# Patient Record
Sex: FEMALE | Race: BLACK OR AFRICAN AMERICAN | HISPANIC OR LATINO | Employment: FULL TIME | URBAN - METROPOLITAN AREA
[De-identification: names, ages, dates, MRNs, and addresses within clinical notes are randomized per-mention and may not be internally consistent; named-entity substitution may affect disease eponyms.]

---

## 2024-01-25 ENCOUNTER — APPOINTMENT (OUTPATIENT)
Dept: ULTRASOUND IMAGING | Facility: CLINIC | Age: 24
End: 2024-01-25
Attending: EMERGENCY MEDICINE
Payer: COMMERCIAL

## 2024-01-25 ENCOUNTER — HOSPITAL ENCOUNTER (EMERGENCY)
Facility: CLINIC | Age: 24
Discharge: HOME | End: 2024-01-25
Attending: EMERGENCY MEDICINE | Admitting: EMERGENCY MEDICINE
Payer: COMMERCIAL

## 2024-01-25 VITALS
RESPIRATION RATE: 16 BRPM | DIASTOLIC BLOOD PRESSURE: 84 MMHG | HEART RATE: 95 BPM | OXYGEN SATURATION: 99 % | SYSTOLIC BLOOD PRESSURE: 137 MMHG | TEMPERATURE: 97.3 F

## 2024-01-25 DIAGNOSIS — K80.20 GALLSTONES: ICD-10-CM

## 2024-01-25 DIAGNOSIS — N39.0 URINARY TRACT INFECTION WITHOUT HEMATURIA, SITE UNSPECIFIED: ICD-10-CM

## 2024-01-25 DIAGNOSIS — Z32.01 PREGNANCY TEST POSITIVE: ICD-10-CM

## 2024-01-25 LAB
ALBUMIN SERPL BCG-MCNC: 4.4 G/DL (ref 3.5–5.2)
ALBUMIN UR-MCNC: 30 MG/DL
ALP SERPL-CCNC: 66 U/L (ref 40–150)
ALT SERPL W P-5'-P-CCNC: 7 U/L (ref 0–50)
ANION GAP SERPL CALCULATED.3IONS-SCNC: 12 MMOL/L (ref 7–15)
APPEARANCE UR: ABNORMAL
AST SERPL W P-5'-P-CCNC: 15 U/L (ref 0–45)
BACTERIA #/AREA URNS HPF: ABNORMAL /HPF
BASOPHILS # BLD AUTO: 0 10E3/UL (ref 0–0.2)
BASOPHILS NFR BLD AUTO: 0 %
BILIRUB SERPL-MCNC: 0.2 MG/DL
BILIRUB UR QL STRIP: NEGATIVE
BUN SERPL-MCNC: 9.5 MG/DL (ref 6–20)
CALCIUM SERPL-MCNC: 8.9 MG/DL (ref 8.6–10)
CHLORIDE SERPL-SCNC: 97 MMOL/L (ref 98–107)
COLOR UR AUTO: YELLOW
CREAT SERPL-MCNC: 0.6 MG/DL (ref 0.51–0.95)
DEPRECATED HCO3 PLAS-SCNC: 23 MMOL/L (ref 22–29)
EGFRCR SERPLBLD CKD-EPI 2021: >90 ML/MIN/1.73M2
EOSINOPHIL # BLD AUTO: 0 10E3/UL (ref 0–0.7)
EOSINOPHIL NFR BLD AUTO: 0 %
ERYTHROCYTE [DISTWIDTH] IN BLOOD BY AUTOMATED COUNT: 13.5 % (ref 10–15)
GLUCOSE SERPL-MCNC: 84 MG/DL (ref 70–99)
GLUCOSE UR STRIP-MCNC: NEGATIVE MG/DL
HCG INTACT+B SERPL-ACNC: ABNORMAL MIU/ML
HCG UR QL: POSITIVE
HCT VFR BLD AUTO: 35.1 % (ref 35–47)
HGB BLD-MCNC: 11.6 G/DL (ref 11.7–15.7)
HGB UR QL STRIP: ABNORMAL
IMM GRANULOCYTES # BLD: 0.1 10E3/UL
IMM GRANULOCYTES NFR BLD: 0 %
KETONES UR STRIP-MCNC: 40 MG/DL
LEUKOCYTE ESTERASE UR QL STRIP: ABNORMAL
LIPASE SERPL-CCNC: 18 U/L (ref 13–60)
LYMPHOCYTES # BLD AUTO: 2.5 10E3/UL (ref 0.8–5.3)
LYMPHOCYTES NFR BLD AUTO: 19 %
MAGNESIUM SERPL-MCNC: 1.8 MG/DL (ref 1.7–2.3)
MCH RBC QN AUTO: 26.3 PG (ref 26.5–33)
MCHC RBC AUTO-ENTMCNC: 33 G/DL (ref 31.5–36.5)
MCV RBC AUTO: 80 FL (ref 78–100)
MONOCYTES # BLD AUTO: 0.8 10E3/UL (ref 0–1.3)
MONOCYTES NFR BLD AUTO: 6 %
MUCOUS THREADS #/AREA URNS LPF: PRESENT /LPF
NEUTROPHILS # BLD AUTO: 10.2 10E3/UL (ref 1.6–8.3)
NEUTROPHILS NFR BLD AUTO: 75 %
NITRATE UR QL: NEGATIVE
NRBC # BLD AUTO: 0 10E3/UL
NRBC BLD AUTO-RTO: 0 /100
PH UR STRIP: 6.5 [PH] (ref 5–7)
PLATELET # BLD AUTO: 251 10E3/UL (ref 150–450)
POTASSIUM SERPL-SCNC: 3.6 MMOL/L (ref 3.4–5.3)
PROT SERPL-MCNC: 7.4 G/DL (ref 6.4–8.3)
RBC # BLD AUTO: 4.41 10E6/UL (ref 3.8–5.2)
RBC URINE: 3 /HPF
SODIUM SERPL-SCNC: 132 MMOL/L (ref 135–145)
SP GR UR STRIP: 1.02 (ref 1–1.03)
SQUAMOUS EPITHELIAL: 19 /HPF
UROBILINOGEN UR STRIP-MCNC: NORMAL MG/DL
WBC # BLD AUTO: 13.6 10E3/UL (ref 4–11)
WBC CLUMPS #/AREA URNS HPF: PRESENT /HPF
WBC URINE: 38 /HPF

## 2024-01-25 PROCEDURE — 99285 EMERGENCY DEPT VISIT HI MDM: CPT | Mod: 25

## 2024-01-25 PROCEDURE — 81025 URINE PREGNANCY TEST: CPT | Performed by: EMERGENCY MEDICINE

## 2024-01-25 PROCEDURE — 76801 OB US < 14 WKS SINGLE FETUS: CPT

## 2024-01-25 PROCEDURE — 250N000011 HC RX IP 250 OP 636: Mod: JZ | Performed by: EMERGENCY MEDICINE

## 2024-01-25 PROCEDURE — 80053 COMPREHEN METABOLIC PANEL: CPT | Performed by: EMERGENCY MEDICINE

## 2024-01-25 PROCEDURE — 258N000003 HC RX IP 258 OP 636: Performed by: EMERGENCY MEDICINE

## 2024-01-25 PROCEDURE — 84702 CHORIONIC GONADOTROPIN TEST: CPT | Performed by: EMERGENCY MEDICINE

## 2024-01-25 PROCEDURE — 96361 HYDRATE IV INFUSION ADD-ON: CPT

## 2024-01-25 PROCEDURE — 85025 COMPLETE CBC W/AUTO DIFF WBC: CPT | Performed by: EMERGENCY MEDICINE

## 2024-01-25 PROCEDURE — 96374 THER/PROPH/DIAG INJ IV PUSH: CPT

## 2024-01-25 PROCEDURE — 76705 ECHO EXAM OF ABDOMEN: CPT

## 2024-01-25 PROCEDURE — 81001 URINALYSIS AUTO W/SCOPE: CPT | Performed by: EMERGENCY MEDICINE

## 2024-01-25 PROCEDURE — 36415 COLL VENOUS BLD VENIPUNCTURE: CPT | Performed by: EMERGENCY MEDICINE

## 2024-01-25 PROCEDURE — 87086 URINE CULTURE/COLONY COUNT: CPT | Performed by: EMERGENCY MEDICINE

## 2024-01-25 PROCEDURE — 83690 ASSAY OF LIPASE: CPT | Performed by: EMERGENCY MEDICINE

## 2024-01-25 PROCEDURE — 83735 ASSAY OF MAGNESIUM: CPT | Performed by: EMERGENCY MEDICINE

## 2024-01-25 PROCEDURE — 250N000013 HC RX MED GY IP 250 OP 250 PS 637: Performed by: EMERGENCY MEDICINE

## 2024-01-25 RX ORDER — METOCLOPRAMIDE HYDROCHLORIDE 5 MG/ML
10 INJECTION INTRAMUSCULAR; INTRAVENOUS ONCE
Status: COMPLETED | OUTPATIENT
Start: 2024-01-25 | End: 2024-01-25

## 2024-01-25 RX ORDER — CEPHALEXIN 500 MG/1
500 CAPSULE ORAL 2 TIMES DAILY
Qty: 14 CAPSULE | Refills: 0 | Status: SHIPPED | OUTPATIENT
Start: 2024-01-25 | End: 2024-02-01

## 2024-01-25 RX ORDER — MAGNESIUM HYDROXIDE/ALUMINUM HYDROXICE/SIMETHICONE 120; 1200; 1200 MG/30ML; MG/30ML; MG/30ML
15 SUSPENSION ORAL ONCE
Status: COMPLETED | OUTPATIENT
Start: 2024-01-25 | End: 2024-01-25

## 2024-01-25 RX ADMIN — SODIUM CHLORIDE 1000 ML: 9 INJECTION, SOLUTION INTRAVENOUS at 19:27

## 2024-01-25 RX ADMIN — ALUMINUM HYDROXIDE, MAGNESIUM HYDROXIDE, AND SIMETHICONE 15 ML: 200; 200; 20 SUSPENSION ORAL at 19:30

## 2024-01-25 RX ADMIN — METOCLOPRAMIDE HYDROCHLORIDE 10 MG: 5 INJECTION INTRAMUSCULAR; INTRAVENOUS at 19:28

## 2024-01-25 ASSESSMENT — ACTIVITIES OF DAILY LIVING (ADL)
ADLS_ACUITY_SCORE: 35
ADLS_ACUITY_SCORE: 33

## 2024-01-26 NOTE — DISCHARGE INSTRUCTIONS
Start prenatal vitamins    Establish care with an OB/GYN    Return to the ER for any worsening or concerning symptoms

## 2024-01-26 NOTE — ED TRIAGE NOTES
Abdominal pain that started 2 week ago. Patient reports ongoing constipation. Denies pain with urination. Nausea but no vomiting.

## 2024-01-26 NOTE — ED PROVIDER NOTES
History     Chief Complaint:  Abdominal Pain     The history is provided by the patient.      Manjeet Shah is a 23 year old female who presents for epigastric abdominal pain.  She reports ongoing epigastric pain over the last 2 weeks.  Symptoms have been intermittent.  Located only to the upper abdomen.  She denies any lower abdominal pain.  Reports that after eating she has worsening symptoms and feels nauseated.  She denies any history of gastroparesis, acid reflux.  She has not taken any over-the-counter medications today.  Reports normal bowel movements.  Normal urination.  Denies any vaginal bleeding or discharge.  States her last mental cycle was 3 weeks ago and is sexually active.  Denies any concern for pregnancy.  No prior pregnancies.  No prior surgical history.  Reports no recent fevers or chills.      Independent Historian:    None- patient only     Review of External Notes:  No prior medical records to review    Medications:    The patient is not currently taking any prescribed medications    Past Medical History:    History reviewed.  No pertinent past medical history     Physical Exam   Patient Vitals for the past 24 hrs:   BP Temp Pulse Resp SpO2   01/25/24 1815 137/84 97.3  F (36.3  C) 95 16 99 %      Physical Exam  Vitals reviewed.   Constitutional:       General: She is not in acute distress.     Appearance: She is not ill-appearing.   HENT:      Head: Normocephalic and atraumatic.   Eyes:      Extraocular Movements: Extraocular movements intact.   Cardiovascular:      Rate and Rhythm: Normal rate and regular rhythm.   Pulmonary:      Effort: Pulmonary effort is normal. No respiratory distress.      Breath sounds: Normal breath sounds. No wheezing.   Abdominal:      Palpations: Abdomen is soft.      Tenderness: There is no abdominal tenderness. There is no guarding. Negative signs include Ortega's sign.   Musculoskeletal:      Cervical back: Normal range of motion.    Skin:     General: Skin is warm and dry.   Neurological:      Mental Status: She is alert and oriented to person, place, and time.      GCS: GCS eye subscore is 4. GCS verbal subscore is 5. GCS motor subscore is 6.   Psychiatric:         Behavior: Behavior normal.           Emergency Department Course   Imaging:  US Abdomen Limited   Final Result   IMPRESSION:   1.  Small stone and polyp in the gallbladder. No evidence for cholecystitis.            US OB < 14 Weeks Single   Final Result   IMPRESSION:    1.  Single living intrauterine gestation at 8 weeks 1 day, EDC 09/04/2024.              Report per radiology    Laboratory:  Labs Ordered and Resulted from Time of ED Arrival to Time of ED Departure   ROUTINE UA WITH MICROSCOPIC REFLEX TO CULTURE - Abnormal       Result Value    Color Urine Yellow      Appearance Urine Cloudy (*)     Glucose Urine Negative      Bilirubin Urine Negative      Ketones Urine 40 (*)     Specific Gravity Urine 1.023      Blood Urine Moderate (*)     pH Urine 6.5      Protein Albumin Urine 30 (*)     Urobilinogen Urine Normal      Nitrite Urine Negative      Leukocyte Esterase Urine Large (*)     Bacteria Urine Many (*)     WBC Clumps Urine Present (*)     Mucus Urine Present (*)     RBC Urine 3 (*)     WBC Urine 38 (*)     Squamous Epithelials Urine 19 (*)    HCG QUALITATIVE URINE - Abnormal    hCG Urine Qualitative Positive (*)    COMPREHENSIVE METABOLIC PANEL - Abnormal    Sodium 132 (*)     Potassium 3.6      Carbon Dioxide (CO2) 23      Anion Gap 12      Urea Nitrogen 9.5      Creatinine 0.60      GFR Estimate >90      Calcium 8.9      Chloride 97 (*)     Glucose 84      Alkaline Phosphatase 66      AST 15      ALT 7      Protein Total 7.4      Albumin 4.4      Bilirubin Total 0.2     HCG QUANTITATIVE PREGNANCY - Abnormal    hCG Quantitative 101,401 (*)    CBC WITH PLATELETS AND DIFFERENTIAL - Abnormal    WBC Count 13.6 (*)     RBC Count 4.41      Hemoglobin 11.6 (*)     Hematocrit  35.1      MCV 80      MCH 26.3 (*)     MCHC 33.0      RDW 13.5      Platelet Count 251      % Neutrophils 75      % Lymphocytes 19      % Monocytes 6      % Eosinophils 0      % Basophils 0      % Immature Granulocytes 0      NRBCs per 100 WBC 0      Absolute Neutrophils 10.2 (*)     Absolute Lymphocytes 2.5      Absolute Monocytes 0.8      Absolute Eosinophils 0.0      Absolute Basophils 0.0      Absolute Immature Granulocytes 0.1      Absolute NRBCs 0.0     MAGNESIUM - Normal    Magnesium 1.8     LIPASE - Normal    Lipase 18     URINE CULTURE      Emergency Department Course & Assessments:     Interventions:  Medications   sodium chloride 0.9% BOLUS 1,000 mL (0 mLs Intravenous Stopped 24)   metoclopramide (REGLAN) injection 10 mg (10 mg Intravenous $Given 24)   alum & mag hydroxide-simethicone (MAALOX) suspension 15 mL (15 mLs Oral $Given 24)      Assessments:   I obtained history and examined the patient as noted above.    I rechecked and updated the patient. At this point I feel that the patient is safe for discharge, and the patient agrees.     Consultations/Discussion of Management or Tests:  None       Social Determinants of Health affecting care:  None      Disposition:  The patient was discharged to home.     Impression & Plan    Medical Decision Makin-year-old female presenting today with epigastric abdominal pain has been ongoing over the last 2 weeks.  Abdomen soft nontender throughout.  Prior to my assessment urine was collected including urine pregnancy.  Is found to be positive.  I discussed incidental positive pregnancy test.  She states that she had a menstrual cycle 3 weeks ago.  Beta-hCG as well as blood work added on.  She reports no lower abdominal pain or vaginal bleeding or discharge.  No prior pregnancies in the past.  Her beta was found to be over 100,000.  She was sent to ultrasound for an ultrasound of the pelvis as well as the right upper  quadrant given her epigastric pain.  LFTs within normal limits.  Lipase was normal.  Ultrasound showed an 8-week gestation intrauterine pregnancy.  Ultrasound of the right upper quadrant showed gallstones with polyp.  I discussed the findings with the patient.  She reported improvement of her symptoms after GI cocktail and Reglan.  Discussed with her plan to establish OB/GYN, start prenatals.  Discussed with her care instructions and return precautions at length.  She verbalized her standing agreement. All questions and concerns addressed at this time.    Diagnosis:    ICD-10-CM    1. Pregnancy test positive  Z32.01       2. Gallstones  K80.20       3. Urinary tract infection without hematuria, site unspecified  N39.0          Discharge Medications:  Discharge Medication List as of 1/25/2024 10:18 PM        START taking these medications    Details   cephALEXin (KEFLEX) 500 MG capsule Take 1 capsule (500 mg) by mouth 2 times daily for 7 days, Disp-14 capsule, R-0, Local Print            Scribe Disclosure:  I, Preethi Otto, am serving as a scribe at 7:19 PM on 1/25/2024 to document services personally performed by Claudia Lanier DO based on my observations and the provider's statements to me.    1/25/2024   Claudia Lanier DO Doan, Tiffani, DO  01/26/24 0018

## 2024-01-27 LAB — BACTERIA UR CULT: NORMAL

## 2024-03-08 LAB
HEPATITIS B SURFACE ANTIGEN (EXTERNAL): NEGATIVE
HIV1+2 AB SERPL QL IA: NEGATIVE
RUBELLA ANTIBODY IGG (EXTERNAL): NORMAL
TREPONEMA PALLIDUM ANTIBODY (EXTERNAL): NONREACTIVE

## 2024-04-09 ENCOUNTER — HOSPITAL ENCOUNTER (OUTPATIENT)
Facility: CLINIC | Age: 24
Discharge: HOME | End: 2024-04-09
Attending: OBSTETRICS & GYNECOLOGY | Admitting: OBSTETRICS & GYNECOLOGY
Payer: COMMERCIAL

## 2024-04-09 VITALS
SYSTOLIC BLOOD PRESSURE: 115 MMHG | DIASTOLIC BLOOD PRESSURE: 77 MMHG | HEART RATE: 80 BPM | RESPIRATION RATE: 18 BRPM | TEMPERATURE: 98.4 F | OXYGEN SATURATION: 100 %

## 2024-04-09 PROBLEM — Z36.89 ENCOUNTER FOR TRIAGE IN PREGNANT PATIENT: Status: ACTIVE | Noted: 2024-04-09

## 2024-04-09 PROCEDURE — G0463 HOSPITAL OUTPT CLINIC VISIT: HCPCS

## 2024-04-09 RX ORDER — LIDOCAINE 40 MG/G
CREAM TOPICAL
Status: DISCONTINUED | OUTPATIENT
Start: 2024-04-09 | End: 2024-04-10 | Stop reason: HOSPADM

## 2024-04-09 ASSESSMENT — ACTIVITIES OF DAILY LIVING (ADL): ADLS_ACUITY_SCORE: 18

## 2024-04-10 ENCOUNTER — ANESTHESIA EVENT (OUTPATIENT)
Dept: OBGYN | Facility: CLINIC | Age: 24
End: 2024-04-10
Payer: COMMERCIAL

## 2024-04-10 ENCOUNTER — ANESTHESIA (OUTPATIENT)
Dept: OBGYN | Facility: CLINIC | Age: 24
End: 2024-04-10
Payer: COMMERCIAL

## 2024-04-10 ENCOUNTER — APPOINTMENT (OUTPATIENT)
Dept: ULTRASOUND IMAGING | Facility: CLINIC | Age: 24
End: 2024-04-10
Attending: STUDENT IN AN ORGANIZED HEALTH CARE EDUCATION/TRAINING PROGRAM
Payer: COMMERCIAL

## 2024-04-10 ENCOUNTER — VIRTUAL VISIT (OUTPATIENT)
Dept: INTERPRETER SERVICES | Facility: CLINIC | Age: 24
End: 2024-04-10

## 2024-04-10 ENCOUNTER — HOSPITAL ENCOUNTER (INPATIENT)
Facility: CLINIC | Age: 24
LOS: 1 days | Discharge: HOME | End: 2024-04-11
Attending: STUDENT IN AN ORGANIZED HEALTH CARE EDUCATION/TRAINING PROGRAM | Admitting: STUDENT IN AN ORGANIZED HEALTH CARE EDUCATION/TRAINING PROGRAM
Payer: COMMERCIAL

## 2024-04-10 DIAGNOSIS — O34.32 PREMATURE CERVICAL DILATION IN SECOND TRIMESTER: Primary | ICD-10-CM

## 2024-04-10 PROBLEM — Z34.90 PREGNANCY: Status: ACTIVE | Noted: 2024-04-10

## 2024-04-10 LAB
ABO/RH(D): NORMAL
ALBUMIN UR-MCNC: NEGATIVE MG/DL
ANTIBODY SCREEN: NEGATIVE
APPEARANCE UR: CLEAR
BACTERIA #/AREA URNS HPF: ABNORMAL /HPF
BASOPHILS # BLD AUTO: 0 10E3/UL (ref 0–0.2)
BASOPHILS NFR BLD AUTO: 0 %
BILIRUB UR QL STRIP: NEGATIVE
CLUE CELLS: NORMAL
COLOR UR AUTO: ABNORMAL
EOSINOPHIL # BLD AUTO: 0 10E3/UL (ref 0–0.7)
EOSINOPHIL NFR BLD AUTO: 0 %
ERYTHROCYTE [DISTWIDTH] IN BLOOD BY AUTOMATED COUNT: 14.5 % (ref 10–15)
GLUCOSE UR STRIP-MCNC: NEGATIVE MG/DL
HCT VFR BLD AUTO: 37.4 % (ref 35–47)
HGB BLD-MCNC: 12 G/DL (ref 11.7–15.7)
HGB UR QL STRIP: ABNORMAL
IMM GRANULOCYTES # BLD: 0.1 10E3/UL
IMM GRANULOCYTES NFR BLD: 0 %
KETONES UR STRIP-MCNC: NEGATIVE MG/DL
LEUKOCYTE ESTERASE UR QL STRIP: NEGATIVE
LYMPHOCYTES # BLD AUTO: 1.5 10E3/UL (ref 0.8–5.3)
LYMPHOCYTES NFR BLD AUTO: 10 %
MCH RBC QN AUTO: 26.8 PG (ref 26.5–33)
MCHC RBC AUTO-ENTMCNC: 32.1 G/DL (ref 31.5–36.5)
MCV RBC AUTO: 84 FL (ref 78–100)
MONOCYTES # BLD AUTO: 1 10E3/UL (ref 0–1.3)
MONOCYTES NFR BLD AUTO: 7 %
MUCOUS THREADS #/AREA URNS LPF: PRESENT /LPF
NEUTROPHILS # BLD AUTO: 11.5 10E3/UL (ref 1.6–8.3)
NEUTROPHILS NFR BLD AUTO: 83 %
NITRATE UR QL: NEGATIVE
NRBC # BLD AUTO: 0 10E3/UL
NRBC BLD AUTO-RTO: 0 /100
PH UR STRIP: 7 [PH] (ref 5–7)
PLATELET # BLD AUTO: 219 10E3/UL (ref 150–450)
RBC # BLD AUTO: 4.47 10E6/UL (ref 3.8–5.2)
RBC URINE: 18 /HPF
SP GR UR STRIP: 1.01 (ref 1–1.03)
SPECIMEN EXPIRATION DATE: NORMAL
SQUAMOUS EPITHELIAL: 1 /HPF
TRICHOMONAS, WET PREP: NORMAL
UROBILINOGEN UR STRIP-MCNC: NORMAL MG/DL
WBC # BLD AUTO: 14.1 10E3/UL (ref 4–11)
WBC URINE: 2 /HPF
WBC'S/HIGH POWER FIELD, WET PREP: NORMAL
YEAST, WET PREP: NORMAL

## 2024-04-10 PROCEDURE — 250N000011 HC RX IP 250 OP 636

## 2024-04-10 PROCEDURE — 120N000001 HC R&B MED SURG/OB

## 2024-04-10 PROCEDURE — 87491 CHLMYD TRACH DNA AMP PROBE: CPT | Performed by: STUDENT IN AN ORGANIZED HEALTH CARE EDUCATION/TRAINING PROGRAM

## 2024-04-10 PROCEDURE — 87210 SMEAR WET MOUNT SALINE/INK: CPT | Performed by: STUDENT IN AN ORGANIZED HEALTH CARE EDUCATION/TRAINING PROGRAM

## 2024-04-10 PROCEDURE — 87086 URINE CULTURE/COLONY COUNT: CPT | Performed by: STUDENT IN AN ORGANIZED HEALTH CARE EDUCATION/TRAINING PROGRAM

## 2024-04-10 PROCEDURE — 86900 BLOOD TYPING SEROLOGIC ABO: CPT | Performed by: STUDENT IN AN ORGANIZED HEALTH CARE EDUCATION/TRAINING PROGRAM

## 2024-04-10 PROCEDURE — 250N000011 HC RX IP 250 OP 636: Performed by: ANESTHESIOLOGY

## 2024-04-10 PROCEDURE — 3E0R3BZ INTRODUCTION OF ANESTHETIC AGENT INTO SPINAL CANAL, PERCUTANEOUS APPROACH: ICD-10-PCS | Performed by: ANESTHESIOLOGY

## 2024-04-10 PROCEDURE — T1013 SIGN LANG/ORAL INTERPRETER: HCPCS | Mod: U4,TEL,95

## 2024-04-10 PROCEDURE — 85004 AUTOMATED DIFF WBC COUNT: CPT | Performed by: STUDENT IN AN ORGANIZED HEALTH CARE EDUCATION/TRAINING PROGRAM

## 2024-04-10 PROCEDURE — 370N000003 HC ANESTHESIA WARD SERVICE: Performed by: ANESTHESIOLOGY

## 2024-04-10 PROCEDURE — 00HU33Z INSERTION OF INFUSION DEVICE INTO SPINAL CANAL, PERCUTANEOUS APPROACH: ICD-10-PCS | Performed by: ANESTHESIOLOGY

## 2024-04-10 PROCEDURE — 76805 OB US >/= 14 WKS SNGL FETUS: CPT

## 2024-04-10 PROCEDURE — 81001 URINALYSIS AUTO W/SCOPE: CPT | Performed by: STUDENT IN AN ORGANIZED HEALTH CARE EDUCATION/TRAINING PROGRAM

## 2024-04-10 PROCEDURE — 250N000011 HC RX IP 250 OP 636: Performed by: STUDENT IN AN ORGANIZED HEALTH CARE EDUCATION/TRAINING PROGRAM

## 2024-04-10 RX ORDER — PROCHLORPERAZINE MALEATE 10 MG
10 TABLET ORAL EVERY 6 HOURS PRN
Status: DISCONTINUED | OUTPATIENT
Start: 2024-04-10 | End: 2024-04-11 | Stop reason: HOSPADM

## 2024-04-10 RX ORDER — MISOPROSTOL 200 UG/1
400 TABLET ORAL
Status: DISCONTINUED | OUTPATIENT
Start: 2024-04-11 | End: 2024-04-11 | Stop reason: HOSPADM

## 2024-04-10 RX ORDER — BUPIVACAINE HYDROCHLORIDE 2.5 MG/ML
10 INJECTION, SOLUTION EPIDURAL; INFILTRATION; INTRACAUDAL ONCE
Status: DISCONTINUED | OUTPATIENT
Start: 2024-04-10 | End: 2024-04-11 | Stop reason: HOSPADM

## 2024-04-10 RX ORDER — ONDANSETRON 2 MG/ML
4 INJECTION INTRAMUSCULAR; INTRAVENOUS EVERY 6 HOURS PRN
Status: DISCONTINUED | OUTPATIENT
Start: 2024-04-10 | End: 2024-04-11 | Stop reason: HOSPADM

## 2024-04-10 RX ORDER — DIPHENOXYLATE HCL/ATROPINE 2.5-.025MG
2 TABLET ORAL ONCE
Status: COMPLETED | OUTPATIENT
Start: 2024-04-10 | End: 2024-04-11

## 2024-04-10 RX ORDER — BUPIVACAINE HYDROCHLORIDE 2.5 MG/ML
INJECTION, SOLUTION EPIDURAL; INFILTRATION; INTRACAUDAL
Status: COMPLETED | OUTPATIENT
Start: 2024-04-10 | End: 2024-04-10

## 2024-04-10 RX ORDER — NALOXONE HYDROCHLORIDE 0.4 MG/ML
0.2 INJECTION, SOLUTION INTRAMUSCULAR; INTRAVENOUS; SUBCUTANEOUS
Status: DISCONTINUED | OUTPATIENT
Start: 2024-04-10 | End: 2024-04-11 | Stop reason: HOSPADM

## 2024-04-10 RX ORDER — PROCHLORPERAZINE 25 MG
25 SUPPOSITORY, RECTAL RECTAL EVERY 12 HOURS PRN
Status: DISCONTINUED | OUTPATIENT
Start: 2024-04-10 | End: 2024-04-11 | Stop reason: HOSPADM

## 2024-04-10 RX ORDER — ACETAMINOPHEN 325 MG/1
650 TABLET ORAL EVERY 4 HOURS PRN
Status: DISCONTINUED | OUTPATIENT
Start: 2024-04-10 | End: 2024-04-11

## 2024-04-10 RX ORDER — ONDANSETRON 4 MG/1
4 TABLET, ORALLY DISINTEGRATING ORAL EVERY 6 HOURS PRN
Status: DISCONTINUED | OUTPATIENT
Start: 2024-04-10 | End: 2024-04-11 | Stop reason: HOSPADM

## 2024-04-10 RX ORDER — NALOXONE HYDROCHLORIDE 0.4 MG/ML
0.4 INJECTION, SOLUTION INTRAMUSCULAR; INTRAVENOUS; SUBCUTANEOUS
Status: DISCONTINUED | OUTPATIENT
Start: 2024-04-10 | End: 2024-04-11 | Stop reason: HOSPADM

## 2024-04-10 RX ORDER — FENTANYL CITRATE 50 UG/ML
INJECTION, SOLUTION INTRAMUSCULAR; INTRAVENOUS
Status: COMPLETED
Start: 2024-04-10 | End: 2024-04-10

## 2024-04-10 RX ORDER — LIDOCAINE HYDROCHLORIDE AND EPINEPHRINE 15; 5 MG/ML; UG/ML
3 INJECTION, SOLUTION EPIDURAL
Status: DISCONTINUED | OUTPATIENT
Start: 2024-04-10 | End: 2024-04-11 | Stop reason: HOSPADM

## 2024-04-10 RX ORDER — CEFAZOLIN SODIUM/WATER 2 G/20 ML
2 SYRINGE (ML) INTRAVENOUS
Status: DISCONTINUED | OUTPATIENT
Start: 2024-04-10 | End: 2024-04-11 | Stop reason: HOSPADM

## 2024-04-10 RX ORDER — LIDOCAINE 40 MG/G
CREAM TOPICAL
Status: DISCONTINUED | OUTPATIENT
Start: 2024-04-10 | End: 2024-04-11 | Stop reason: HOSPADM

## 2024-04-10 RX ORDER — FENTANYL CITRATE 50 UG/ML
50 INJECTION, SOLUTION INTRAMUSCULAR; INTRAVENOUS EVERY 30 MIN PRN
Status: DISCONTINUED | OUTPATIENT
Start: 2024-04-10 | End: 2024-04-11 | Stop reason: HOSPADM

## 2024-04-10 RX ORDER — METOCLOPRAMIDE HYDROCHLORIDE 5 MG/ML
10 INJECTION INTRAMUSCULAR; INTRAVENOUS EVERY 6 HOURS PRN
Status: DISCONTINUED | OUTPATIENT
Start: 2024-04-10 | End: 2024-04-11 | Stop reason: HOSPADM

## 2024-04-10 RX ORDER — MIFEPRISTONE 200 MG/1
200 TABLET ORAL ONCE
Status: DISCONTINUED | OUTPATIENT
Start: 2024-04-10 | End: 2024-04-10

## 2024-04-10 RX ORDER — NALBUPHINE HYDROCHLORIDE 20 MG/ML
2.5-5 INJECTION, SOLUTION INTRAMUSCULAR; INTRAVENOUS; SUBCUTANEOUS EVERY 6 HOURS PRN
Status: DISCONTINUED | OUTPATIENT
Start: 2024-04-10 | End: 2024-04-11 | Stop reason: HOSPADM

## 2024-04-10 RX ORDER — HYDROXYZINE HYDROCHLORIDE 50 MG/1
50-100 TABLET, FILM COATED ORAL EVERY 6 HOURS PRN
Status: DISCONTINUED | OUTPATIENT
Start: 2024-04-10 | End: 2024-04-11 | Stop reason: HOSPADM

## 2024-04-10 RX ORDER — METOCLOPRAMIDE 10 MG/1
10 TABLET ORAL EVERY 6 HOURS PRN
Status: DISCONTINUED | OUTPATIENT
Start: 2024-04-10 | End: 2024-04-11 | Stop reason: HOSPADM

## 2024-04-10 RX ORDER — FENTANYL CITRATE-0.9 % NACL/PF 10 MCG/ML
100 PLASTIC BAG, INJECTION (ML) INTRAVENOUS EVERY 5 MIN PRN
Status: DISCONTINUED | OUTPATIENT
Start: 2024-04-10 | End: 2024-04-11 | Stop reason: HOSPADM

## 2024-04-10 RX ORDER — DIPHENOXYLATE HCL/ATROPINE 2.5-.025MG
2 TABLET ORAL EVERY 6 HOURS PRN
Status: DISCONTINUED | OUTPATIENT
Start: 2024-04-10 | End: 2024-04-11 | Stop reason: HOSPADM

## 2024-04-10 RX ORDER — FENTANYL/BUPIVACAINE/NS/PF 2-1250MCG
PLASTIC BAG, INJECTION (ML) INJECTION
Status: COMPLETED
Start: 2024-04-10 | End: 2024-04-11

## 2024-04-10 RX ORDER — CEFAZOLIN SODIUM/WATER 2 G/20 ML
2 SYRINGE (ML) INTRAVENOUS SEE ADMIN INSTRUCTIONS
Status: DISCONTINUED | OUTPATIENT
Start: 2024-04-10 | End: 2024-04-11 | Stop reason: HOSPADM

## 2024-04-10 RX ADMIN — FENTANYL CITRATE 50 MCG: 0.05 INJECTION, SOLUTION INTRAMUSCULAR; INTRAVENOUS at 21:04

## 2024-04-10 RX ADMIN — BUPIVACAINE HYDROCHLORIDE 10 ML: 2.5 INJECTION, SOLUTION EPIDURAL; INFILTRATION; INTRACAUDAL at 23:34

## 2024-04-10 RX ADMIN — FENTANYL CITRATE 50 MCG: 0.05 INJECTION, SOLUTION INTRAMUSCULAR; INTRAVENOUS at 23:02

## 2024-04-10 RX ADMIN — Medication 100 MCG: at 23:50

## 2024-04-10 RX ADMIN — Medication 100 MCG: at 23:44

## 2024-04-10 ASSESSMENT — ACTIVITIES OF DAILY LIVING (ADL)
ADLS_ACUITY_SCORE: 35
ADLS_ACUITY_SCORE: 33
ADLS_ACUITY_SCORE: 18
ADLS_ACUITY_SCORE: 35
ADLS_ACUITY_SCORE: 18
ADLS_ACUITY_SCORE: 35

## 2024-04-10 NOTE — PROVIDER NOTIFICATION
04/10/24 1645   Provider Notification   Provider Name/Title Dr Silva   Method of Notification At Bedside   Notification Reason Other (Comment)     In to discuss poc with pt with  ipad.

## 2024-04-10 NOTE — PROVIDER NOTIFICATION
24 2143   Provider Notification   Provider Name/Title Dr. Francie Soto   Method of Notification Phone   Request Evaluate-Remote   Notification Reason Status Update     Communication with Dr. Francie Soto via phone. RN reported the arrival of 24 yo.  at 18 weeks 6 days reporting vaginal bleeding when wiping and an incident of RLQ abdominal cramping that lasted 5 mins and resolved without intervention. O+ blood type. Seen in clinic . Unremarkable pregnancy, gallstones dx in . Patient had intercourse last night. FHR on doptones 150. VSS. Reported external assessment completed and minimal, scant blood present on perineum, no active bleeding.   TORB to discharge home, pelvic rest, monitor bleeding and follow- up in clinic within 7 days.

## 2024-04-10 NOTE — PROVIDER NOTIFICATION
04/10/24 1610   Provider Notification   Provider Name/Title Dr Silva   Method of Notification At Bedside   Notification Reason Status Update

## 2024-04-10 NOTE — PLAN OF CARE
Patient's After Visit Summary was reviewed with patient.   Patient verbalized understanding of After Visit Summary, recommended follow up and was given an opportunity to ask questions.   Discharge medications sent home with patient/family: Not applicable   Discharged home independently.

## 2024-04-10 NOTE — DISCHARGE INSTRUCTIONS
You were seen in maternal assessment for vaginal bleeding. Your baby's heart rate was 150bpm. Follow-up in clinic within 7 days, return if new or worsening bleeding, abdominal pain and nothing in the vagina.

## 2024-04-10 NOTE — PLAN OF CARE
Data: [facility]: 4/10/2024  3:55 PM.  Reason for maternal/fetal assessment is sent by MD from clinic for amniotic membranes outside of cervix.  Patient is a .  Prenatal record reviewed. Pregnancy has been uncomplicated..  Gestational Age 19w0d. VSS. Fetal movement active. Patient denies uterine contractions, leaking of vaginal fluid/rupture of membranes, vaginal bleeding, abdominal pain, pelvic pressure, nausea, vomiting, headache, visual disturbances, epigastric or URQ pain, significant edema. Support person is not present.   Action: Verbal consent for EFM. Triage assessment completed. Bill of rights reviewed.  Response: Patient verbalized agreement with plan. Will contact Dr Aiyana Phelps with update and for further orders.

## 2024-04-10 NOTE — LETTER
[facility]  [address]  [facility] [address]  Phone: [phone]  Fax: [phone]    April 11, 2024        Manjeet Shah  [address]   [facility] [address]          To whom it may concern:    RE: Manjeet Shah was admitted from 4/10/2024 to 4/11/2024, and has experienced a pregnancy loss. She will require two weeks of medical leave, for physical healing, and for grieving.    Please do not hesitate to contact us with questions or concerns.    Please contact me for questions or concerns.      Respectfully,            Viri Mendez MD

## 2024-04-10 NOTE — H&P
[facility]   Labor & Delivery H&P    Manjeet Shah YOB: 2000   MRN [id number] Primary OB: [facility]     [facility]   Manjeet Shah is a 23 year old  at 19w0d by LMP c/w 8wk US presenting for evaluation after being seen in clinic today. Presented last night to triage for a small amount of vaginal bleeding. Was seen in clinic today for close follow up and speculum exam concerning for bag of water in the vagina.     Here, she denies any cramping or contractions. She continues to have some vaginal bleeding. She denies any large gush of fluid. She denies any recent illness or fevers.      PREGNANCY HISTORY   OB PROBLEM LIST  #. Elevated A1c, early 2hr OGTT normal    OB History    Para Term  AB Living   1 0 0 0 0 0   SAB IAB Ectopic Multiple Live Births   0 0 0 0 0      # Outcome Date GA Lbr Mario/2nd Weight Sex Type Anes PTL Lv   1 Current              MATERNAL MEDICAL HISTORY   No past medical history on file.    No past surgical history on file.    No family history on file.    Social History     Tobacco Use    Smoking status: Never    Smokeless tobacco: Never   Substance Use Topics    Drug use: Never     Medications Prior to Admission   Medication Sig Dispense Refill Last Dose    Prenatal MV & Min w/FA-DHA (PRENATAL GUMMIES) 0.18-25 MG CHEW         No Known Allergies     OBJECTIVE     Vitals:    04/10/24 1610   Resp: 20   Temp: 99  F (37.2  C)   TempSrc: Oral     Physical Exam  General: Alert, in no acute distress, resting comfortably in bed.   Neuro: Grossly normal to observation.  Psych: Alert, oriented, affect appropriate.  Cardiovascular: Normal rate, wwp.   Respiratory: Nonlabored breathing, equal chest rise/fall bilaterally.   Abdomen: Nontender.   Skin: Color, texture, turgor normal. No concerning rashes or lesions.    SSE: prolapsing bag of water to approximately +2 station. No visible fetal parts. Thin light blood  in the vaginal vault.     Formal Ultrasound:  Biparietal Diameter: 4.2 cm, 18 weeks 5 days  Head Circumference: 16.3 cm, 19 weeks 1 day  Abdominal Circumference: 13.3 cm, 18 weeks 6 days  Femur Length: 2.9 cm, 19 weeks 1 day     Estimated Fetal Weight: 264 g  EFW Percentile: %     Fetal Heart Rate: 153 bpm  Cervical Length: Dilated fluid-filled effaced cervix. Internal os is open, dilated to 1.0 cm.    ASSESSMENT & PLAN   Manjeet Shah is a 23 year old  at 19w0d by LMP c/w 8wk US (MIKEY 24) admitted for cervical dilation with prolapsing amniotic sac.    #. Cervical Dilation, Prolapsing Amniotic Sac:   - Discussed case with on-call MFM Dr. Montenegro. Due to the amount of amniotic sac in the vagina and very high risk of infection, at this point rescue cerclage is not a safe or feasible option. Options available at this time are expectant management, induction of labor, or dilation & evacuation.   - GC/CT/Trich, Wet prep, UA/Urine Culture, CBC and T&S sent.   - Discussed exam and ultrasound findings as well as the conversation with MFM with Manjeet and her family. Expressed my condolences for how difficult, challenging, and unexpected this is. We reviewed given the amount of amniotic sac in the vagina, it is unlikely that this pregnancy will continue for much longer, and at this gestational age the fetus would not be able to survive on its own outside the uterus. We reviewed the risk of infection and hemorrhage. It is very likely that she may begin to labor on her own soon.   - We discussed the options available, including expectant management, induction of labor, and D&E and the procedures in detail. We discussed the advantages/disadvantages of these methods. Discussed that D&E could be performed tomorrow around noon. Manjeet desires time to discuss with her family and consider these options.   - Will plan to admit for close monitoring   > Q4hr vitals with temperature   > Close monitoring of  vaginal bleeding   > General diet, NPO at midnight if patient elects for D&E   > Pain control per patient request   > SW consult ordered. Patient and family declined  support at this time.     #. Prenatal Care:   - OB labs: O+, Rubella immune, HIV neg, Heb B neg, Heb C neg, RPR negative  - Genetics: NIPS low risk, AFP not completed   - Rh positive, Rhogam not indicated  - S/p Flu, COVID vaccines    #. Elevated A1c at NOB1:  - Normal early 2 hour GTT:   - Plan on 2 hour GTT between 24-28 weeks     #. Family Hx Preeclampsia:   - PPx ASA recommended, she has not been taking    #. Disposition: inpatient    Aiyana Silva MD  [facility]  [phone]  04/10/2024 7:24 PM

## 2024-04-10 NOTE — PLAN OF CARE
Data: [facility] presented [facility]: 2024  9:27 PM.  Reason for maternal/fetal assessment is vaginal bleeding and abdominal pain. Patient reports vaginal bleeding when wiping tonight, and 5 mins of RLQ cramping, denies pain upon admission. Reports sexual intercourse yesterday, denies falls or injury. Patient denies uterine contractions, leaking of vaginal fluid/rupture of membranes, pelvic pressure, nausea, vomiting, headache, visual disturbances, epigastric or RUQ pain, significant edema. Patient is a 18w6d .  Prenatal record reviewed. Pregnancy has been uncomplicated.    Vital signs wnl. Support person is not present.     Action: Verbal consent for EFM. Triage assessment completed.     Response: Patient verbalized agreement with plan. Will contact Dr. Francie Soto with update and further orders.

## 2024-04-11 VITALS
OXYGEN SATURATION: 99 % | DIASTOLIC BLOOD PRESSURE: 72 MMHG | RESPIRATION RATE: 16 BRPM | TEMPERATURE: 97.9 F | SYSTOLIC BLOOD PRESSURE: 116 MMHG

## 2024-04-11 LAB
C TRACH DNA SPEC QL PROBE+SIG AMP: NEGATIVE
N GONORRHOEA DNA SPEC QL NAA+PROBE: NEGATIVE

## 2024-04-11 PROCEDURE — 88305 TISSUE EXAM BY PATHOLOGIST: CPT | Mod: 26 | Performed by: PATHOLOGY

## 2024-04-11 PROCEDURE — 250N000009 HC RX 250: Performed by: STUDENT IN AN ORGANIZED HEALTH CARE EDUCATION/TRAINING PROGRAM

## 2024-04-11 PROCEDURE — 722N000001 HC LABOR CARE VAGINAL DELIVERY SINGLE

## 2024-04-11 PROCEDURE — 88305 TISSUE EXAM BY PATHOLOGIST: CPT | Mod: TC | Performed by: STUDENT IN AN ORGANIZED HEALTH CARE EDUCATION/TRAINING PROGRAM

## 2024-04-11 PROCEDURE — 3E0P7VZ INTRODUCTION OF HORMONE INTO FEMALE REPRODUCTIVE, VIA NATURAL OR ARTIFICIAL OPENING: ICD-10-PCS | Performed by: STUDENT IN AN ORGANIZED HEALTH CARE EDUCATION/TRAINING PROGRAM

## 2024-04-11 PROCEDURE — 88300 SURGICAL PATH GROSS: CPT | Mod: 26 | Performed by: PATHOLOGY

## 2024-04-11 PROCEDURE — 10D17Z9 MANUAL EXTRACTION OF PRODUCTS OF CONCEPTION, RETAINED, VIA NATURAL OR ARTIFICIAL OPENING: ICD-10-PCS | Performed by: STUDENT IN AN ORGANIZED HEALTH CARE EDUCATION/TRAINING PROGRAM

## 2024-04-11 PROCEDURE — 250N000013 HC RX MED GY IP 250 OP 250 PS 637: Performed by: STUDENT IN AN ORGANIZED HEALTH CARE EDUCATION/TRAINING PROGRAM

## 2024-04-11 PROCEDURE — 250N000011 HC RX IP 250 OP 636

## 2024-04-11 PROCEDURE — 88300 SURGICAL PATH GROSS: CPT | Mod: TC | Performed by: OBSTETRICS & GYNECOLOGY

## 2024-04-11 PROCEDURE — 250N000011 HC RX IP 250 OP 636: Performed by: ANESTHESIOLOGY

## 2024-04-11 RX ORDER — MISOPROSTOL 200 UG/1
400 TABLET ORAL
Status: DISCONTINUED | OUTPATIENT
Start: 2024-04-11 | End: 2024-04-11 | Stop reason: HOSPADM

## 2024-04-11 RX ORDER — LOPERAMIDE HCL 2 MG
4 CAPSULE ORAL
Status: DISCONTINUED | OUTPATIENT
Start: 2024-04-11 | End: 2024-04-11 | Stop reason: HOSPADM

## 2024-04-11 RX ORDER — METHYLERGONOVINE MALEATE 0.2 MG/ML
200 INJECTION INTRAVENOUS
Status: DISCONTINUED | OUTPATIENT
Start: 2024-04-11 | End: 2024-04-11 | Stop reason: HOSPADM

## 2024-04-11 RX ORDER — CARBOPROST TROMETHAMINE 250 UG/ML
250 INJECTION, SOLUTION INTRAMUSCULAR
Status: DISCONTINUED | OUTPATIENT
Start: 2024-04-11 | End: 2024-04-11 | Stop reason: HOSPADM

## 2024-04-11 RX ORDER — OXYTOCIN 10 [USP'U]/ML
10 INJECTION, SOLUTION INTRAMUSCULAR; INTRAVENOUS
Status: DISCONTINUED | OUTPATIENT
Start: 2024-04-11 | End: 2024-04-11 | Stop reason: HOSPADM

## 2024-04-11 RX ORDER — BISACODYL 10 MG
10 SUPPOSITORY, RECTAL RECTAL DAILY PRN
Status: DISCONTINUED | OUTPATIENT
Start: 2024-04-11 | End: 2024-04-11 | Stop reason: HOSPADM

## 2024-04-11 RX ORDER — MISOPROSTOL 200 UG/1
800 TABLET ORAL
Status: DISCONTINUED | OUTPATIENT
Start: 2024-04-11 | End: 2024-04-11 | Stop reason: HOSPADM

## 2024-04-11 RX ORDER — OXYTOCIN/0.9 % SODIUM CHLORIDE 30/500 ML
340 PLASTIC BAG, INJECTION (ML) INTRAVENOUS CONTINUOUS PRN
Status: DISCONTINUED | OUTPATIENT
Start: 2024-04-11 | End: 2024-04-11 | Stop reason: HOSPADM

## 2024-04-11 RX ORDER — TRANEXAMIC ACID 10 MG/ML
1 INJECTION, SOLUTION INTRAVENOUS EVERY 30 MIN PRN
Status: DISCONTINUED | OUTPATIENT
Start: 2024-04-11 | End: 2024-04-11 | Stop reason: HOSPADM

## 2024-04-11 RX ORDER — ACETAMINOPHEN 325 MG/10.15ML
975 LIQUID ORAL EVERY 6 HOURS PRN
Status: DISCONTINUED | OUTPATIENT
Start: 2024-04-11 | End: 2024-04-11 | Stop reason: HOSPADM

## 2024-04-11 RX ORDER — IBUPROFEN 100 MG/5ML
600 SUSPENSION, ORAL (FINAL DOSE FORM) ORAL EVERY 6 HOURS PRN
Qty: 473 ML | Refills: 1 | Status: SHIPPED | OUTPATIENT
Start: 2024-04-11

## 2024-04-11 RX ORDER — LOPERAMIDE HCL 2 MG
2 CAPSULE ORAL
Status: DISCONTINUED | OUTPATIENT
Start: 2024-04-11 | End: 2024-04-11 | Stop reason: HOSPADM

## 2024-04-11 RX ORDER — IBUPROFEN 100 MG/5ML
600 SUSPENSION, ORAL (FINAL DOSE FORM) ORAL EVERY 6 HOURS PRN
Status: DISCONTINUED | OUTPATIENT
Start: 2024-04-11 | End: 2024-04-11 | Stop reason: HOSPADM

## 2024-04-11 RX ADMIN — Medication: at 00:00

## 2024-04-11 RX ADMIN — Medication 340 ML/HR: at 06:07

## 2024-04-11 RX ADMIN — MISOPROSTOL 400 MCG: 200 TABLET ORAL at 03:30

## 2024-04-11 RX ADMIN — ACETAMINOPHEN 975 MG: 325 SUSPENSION ORAL at 03:10

## 2024-04-11 RX ADMIN — Medication 100 MCG: at 00:05

## 2024-04-11 RX ADMIN — MISOPROSTOL 400 MCG: 200 TABLET ORAL at 00:29

## 2024-04-11 ASSESSMENT — ACTIVITIES OF DAILY LIVING (ADL)
ADLS_ACUITY_SCORE: 18
ADLS_ACUITY_SCORE: 19
ADLS_ACUITY_SCORE: 18
ADLS_ACUITY_SCORE: 19
ADLS_ACUITY_SCORE: 19
ADLS_ACUITY_SCORE: 18
ADLS_ACUITY_SCORE: 19
ADLS_ACUITY_SCORE: 18

## 2024-04-11 NOTE — PROVIDER NOTIFICATION
04/10/24 2211   Provider Notification   Provider Name/Title    Method of Notification Phone     Pharmacy called unit to notify that we do not stock the medication Mifepristone. RN notified MD and called back pharmacy to contact MD to discuss options. MD reported back to RN that we are unable and orders have been discontinued.

## 2024-04-11 NOTE — PROVIDER NOTIFICATION
04/11/24 0118   Provider Notification   Provider Name/Title    Method of Notification In Department     Updated MD, Pt SROM 0115 clear, blood tinged. Not feeling pressure, still comfortable with epidural. Temp 98.0.f. continuing with poc.

## 2024-04-11 NOTE — PROGRESS NOTES
Brief Update Note    Patient feeling more uncomfortable and cramping. Has elected for D&E.     Reviewed D&E procedure in detail and discussed risks including bleeding, infection, risk of uterine perforation. Discussed the fetus would not be intact and would go to pathology for examination. Reviewed consent will be signed in preoperative area. Discussed given the findings on ultrasound and her increasing discomfort, it is likely that she is starting to labor and that vaginal delivery before the planned procedure is a very real possibility. Patient still desires D&E at this point.     Case request placed with ultrasound guidance, OR notified  - NPO at midnight  - 2g Ancef for preoperative antibiotics  - Mifepristone tonight, consent signed    MD Pao [facility]oFormerly [facility]  [phone]  04/10/2024 8:41 PM

## 2024-04-11 NOTE — PROVIDER NOTIFICATION
04/10/24 5040   Provider Notification   Provider Name/Title    Method of Notification Phone   Request Evaluate in Person   Notification Reason Status Update     Paged MD, pt called out and is feeling tons of more pressure says she has to poop. Appears very shaky, in pain. Declines more pain medication and asked for the doctor.   RN called MD to discuss are we going to check cervix or ultrasound when pt is feeling pressure and discuss poc. MD coming to bedside to evaluate.

## 2024-04-11 NOTE — PROGRESS NOTES
Brief Update Note    Patient reports feeling like she needs to have a bowel movement. Continues to have small amount of vaginal bleeding. Nothing on pad underneath her.     Bedside ultrasound demonstrates pregnancy in footling breech presentation. Cervix dilated with amniotic sac prolapsing into vagina. Intermittently the fetal legs pass through the cervix into the portion of bag within the vagina.     Discussed findings with patient and her family with iPad . Reviewed this is concerning for progression of the cervical dilation. Discussed induction of labor would likely be a quick process given the above findings. Patient will discuss options further with her family.     Lauren MacNeill, MD [facility]  [phone]  04/10/2024 7:13 PM

## 2024-04-11 NOTE — DISCHARGE SUMMARY
[facility]   Discharge Summary    Manjeet Shah YOB: 2000   MRN [id number] Primary OB: [facility]     Date of Admission: 4/10/2024   Date of Admission: 2024   Admitting Physician: Aiyana Silva MD   Discharge Physician:  Viri Mendez MD      Admission Diagnoses   - Intrauterine pregnancy at 19w0d  - Vaginal bleeding  - Cervical dilation and prolapsing amniotic sac  - Family history of preeclampsia  - Elevated A1c at NOB1, normal 2hr OGTT     Discharge Diagnoses   - Same, now delivered    Procedures   Vaginal delivery of demised infant on  at 19w1d  Epidural placement    Medications Prior to Admission     Medications Prior to Admission   Medication Sig Dispense Refill Last Dose    Prenatal MV & Min w/FA-DHA (PRENATAL GUMMIES) 0.18-25 MG CHEW          Discharge Medications     Current Discharge Medication List        CONTINUE these medications which have NOT CHANGED    Details   Prenatal MV & Min w/FA-DHA (PRENATAL GUMMIES) 0.18-25 MG CHEW             Brief Admission History     From the admit note of Dr. Silva:     Manjeet Shah is a 23 year old  at 19w0d by LMP c/w 8wk US (MIKEY 24) admitted for cervical dilation with prolapsing amniotic sac.     #. Cervical Dilation, Prolapsing Amniotic Sac:   - Discussed case with on-call MFM Dr. Montenegro. Due to the amount of amniotic sac in the vagina and very high risk of infection, at this point rescue cerclage is not a safe or feasible option. Options available at this time are expectant management, induction of labor, or dilation & evacuation.   - GC/CT/Trich, Wet prep, UA/Urine Culture, CBC and T&S sent.   - Discussed exam and ultrasound findings as well as the conversation with MFM with Manjeet and her family. Expressed my condolences for how difficult, challenging, and unexpected this is. We reviewed given the amount of amniotic sac in the vagina, it is unlikely  that this pregnancy will continue for much longer, and at this gestational age the fetus would not be able to survive on its own outside the uterus. We reviewed the risk of infection and hemorrhage. It is very likely that she may begin to labor on her own soon.   - We discussed the options available, including expectant management, induction of labor, and D&E and the procedures in detail. We discussed the advantages/disadvantages of these methods. Discussed that D&E could be performed tomorrow around noon. Manjeet desires time to discuss with her family and consider these options.   - Will plan to admit for close monitoring               > Q4hr vitals with temperature               > Close monitoring of vaginal bleeding               > General diet, NPO at midnight if patient elects for D&E               > Pain control per patient request               > SW consult ordered. Patient and family declined  support at this time.      #. Prenatal Care:   - OB labs: O+, Rubella immune, HIV neg, Heb B neg, Heb C neg, RPR negative  - Genetics: NIPS low risk, AFP not completed   - Rh positive, Rhogam not indicated  - S/p Flu, COVID vaccines    #. Elevated A1c at NOB1:  - Normal early 2 hour GTT:   - Plan on 2 hour GTT between 24-28 weeks     #. Family Hx Preeclampsia:   - PPx ASA recommended, she has not been taking     #. Disposition: inpatient     Intrapartum Course     From the delivery note of Dr. Silva:     DELIVERY DATE: 24   DELIVERY TIME: 0232    FINDINGS   1. Demised female infant at 19w1d.  2. Intact perineum.   3. Placenta intact, sent for pathology.    DELIVERY DETAILS  Manjeet Shah is a 23 year old, now , who was admitted at 19w0d for cervical dilation and prolapsing amniotic sac. Based on ultrasound and speculum examination she was not felt to be a candidate for cerclage placement by Maternal Fetal Medicine. She was counseled on options for management and initially  elected for dilation and evacuation, however overnight became more uncomfortable and desired to move forward with induction of labor to facilitate faster treatment. She received an epidural for analgesia. She received 1 dose of vaginal Misoprostol. Rupture of membranes occurred. Approximately 2 hours after Misoprostol administration she felt increased pressure and the fetal head was seen at the perineum.       The head was grasped and the remainder of the body was gently delivered from the vagina. The cord was clamped and cut, the infant was wrapped in a blanket and handed to [facility] per her request. The placenta did not immediately deliver with gentle traction. After 1 hour, placenta was still not able to be delivered. An additional dose of vaginal Misoprostol was administered. Approximately 2 hours after this dose was administered the patient felt more intense cramping. Exam revealed placenta bulging through the cervix. With gentle traction the placenta was manually extracted. Ultrasound afterwards confirmed thin endometrial stripe. The perineum was inspected and intact. Good hemostasis was noted.     EBL: 150cc.    COMPLICATIONS: none     Postpartum Course     The patient's hospital course was unremarkable. She received routine postpartum care and recovered without complication. On PPD#0, her pain was well controlled with PO medications and lochia was stable. She was ambulating, voiding without difficulty, and tolerating a general diet. She was discharged to home in stable condition.     Postpartum Hemoglobin:   Hemoglobin   Date Value Ref Range Status   04/10/2024 12.0 11.7 - 15.7 g/dL Final     Rh Status: positive, Rhogam is not indicated.   Rubella Status: immune, MMR is not indicated.     Discharge Instructions & Follow Up     Discharge Diet Regular   Discharge Activity Pelvic rest for 6 weeks including no sexual intercourse, tampons, or douching.     Discharge Follow Up Follow up with primary OB for routine  postpartum visit in 2 weeks     Discharge Disposition   Home    Viri Mendez MD on 4/11/2024 at 11:21 AM

## 2024-04-11 NOTE — PROVIDER NOTIFICATION
04/11/24 0230   Provider Notification   Provider Name/Title    Method of Notification In Department   Request Attend Delivery     RN called out for MD to come to bedside, pt feeling pressure. Fetal head exposed. MD to bedside to deliver fetus.

## 2024-04-11 NOTE — ANESTHESIA PREPROCEDURE EVALUATION
"Anesthesia Pre-Procedure Evaluation    Patient: Manjeet Shah   MRN: [id number] : 2000        Procedure : Procedure(s):  DILATION AND EVACUATION, UTERUS WITH ULTRASOUND GUIDANCE          History reviewed. No pertinent past medical history.   History reviewed. No pertinent surgical history.   No Known Allergies   Social History     Tobacco Use    Smoking status: Never    Smokeless tobacco: Never   Substance Use Topics    Alcohol use: Not on file      Wt Readings from Last 1 Encounters:   No data found for Wt        Anesthesia Evaluation        No history of anesthetic complications       ROS/MED HX  ENT/Pulmonary:  - neg pulmonary ROS     Neurologic:  - neg neurologic ROS     Cardiovascular:  - neg cardiovascular ROS     METS/Exercise Tolerance:     Hematologic:  - neg hematologic  ROS     Musculoskeletal:       GI/Hepatic:  - neg GI/hepatic ROS     Renal/Genitourinary:       Endo:  - neg endo ROS     Psychiatric/Substance Use:  - neg psychiatric ROS     Infectious Disease:       Malignancy:       Other:     (-) previous  and TOLAC candidate       Physical Exam    Airway        Mallampati: II   TM distance: > 3 FB   Neck ROM: full   Mouth opening: > 3 cm    Respiratory Devices and Support         Dental  no notable dental history         Cardiovascular   cardiovascular exam normal          Pulmonary   pulmonary exam normal                OUTSIDE LABS:  CBC:   Lab Results   Component Value Date    WBC 14.1 (H) 04/10/2024    WBC 13.6 (H) 2024    HGB 12.0 04/10/2024    HGB 11.6 (L) 2024    HCT 37.4 04/10/2024    HCT 35.1 2024     04/10/2024     2024     BMP:   Lab Results   Component Value Date     (L) 2024    POTASSIUM 3.6 2024    CHLORIDE 97 (L) 2024    CO2 23 2024    BUN 9.5 2024    CR 0.60 2024    GLC 84 2024     COAGS: No results found for: \"PTT\", \"INR\", \"FIBR\"  POC:   Lab Results   Component " Value Date    HCG Positive (A) 01/25/2024     HEPATIC:   Lab Results   Component Value Date    ALBUMIN 4.4 01/25/2024    PROTTOTAL 7.4 01/25/2024    ALT 7 01/25/2024    AST 15 01/25/2024    ALKPHOS 66 01/25/2024    BILITOTAL 0.2 01/25/2024     OTHER:   Lab Results   Component Value Date    ISAURA 8.9 01/25/2024    MAG 1.8 01/25/2024    LIPASE 18 01/25/2024       Anesthesia Plan    ASA Status:  2       Anesthesia Type: Epidural.              Consents    Anesthesia Plan(s) and associated risks, benefits, and realistic alternatives discussed. Questions answered and patient/representative(s) expressed understanding.     - Discussed:     - Discussed with:  Patient            Postoperative Care            Comments:           neg OB ROS.      Aric Forde MD    I have reviewed the pertinent notes and labs in the chart from the past 30 days and (re)examined the patient.  Any updates or changes from those notes are reflected in this note.

## 2024-04-11 NOTE — L&D DELIVERY NOTE
[facility]   Vaginal Delivery Note    Manjeet Shah YOB: 2000   MRN [id number] Primary OB: [facility]     DELIVERY DATE: 24   DELIVERY TIME: 0232    FINDINGS   1. Demised female infant at 19w1d.  2. Intact perineum.   3. Placenta intact, sent for pathology.    DELIVERY DETAILS  Manjeet Shah is a 23 year old, now , who was admitted at 19w0d for cervical dilation and prolapsing amniotic sac. Based on ultrasound and speculum examination she was not felt to be a candidate for cerclage placement by Maternal Fetal Medicine. She was counseled on options for management and initially elected for dilation and evacuation, however overnight became more uncomfortable and desired to move forward with induction of labor to facilitate faster treatment. She received an epidural for analgesia. She received 1 dose of vaginal Misoprostol. Rupture of membranes occurred. Approximately 2 hours after Misoprostol administration she felt increased pressure and the fetal head was seen at the perineum.       The head was grasped and the remainder of the body was gently delivered from the vagina. The cord was clamped and cut, the infant was wrapped in a blanket and handed to laura per her request. The placenta did not immediately deliver with gentle traction. After 1 hour, placenta was still not able to be delivered. An additional dose of vaginal Misoprostol was administered. Approximately 2 hours after this dose was administered the patient felt more intense cramping. Exam revealed placenta bulging through the cervix. With gentle traction the placenta was manually extracted. Ultrasound afterwards confirmed thin endometrial stripe. The perineum was inspected and intact. Good hemostasis was noted.     EBL: 150cc.    COMPLICATIONS: none    Lauren MacNeill, MD [facility]  [phone]  2024 2:42 AM

## 2024-04-11 NOTE — PROVIDER NOTIFICATION
04/10/24 1725   Provider Notification   Provider Name/Title MD Shira   Method of Notification At Bedside     MD bedside discussing options with pt and family. Pt is going to stay overnight and determine what her decision will be.

## 2024-04-11 NOTE — CARE PLAN
Patient's After Visit Summary was reviewed with patient and/or family and significant other.   Patient verbalized understanding of After Visit Summary, recommended follow up and was given an opportunity to ask questions.   Discharge medications sent home with patient/family: YES  Discharged with significant other and family via ambulation at 1240.     Support resources given to patient and family. Patient declined I-pad  during discharge process.

## 2024-04-11 NOTE — PROVIDER NOTIFICATION
04/11/24 0504   Provider Notification   Provider Name/Title    Method of Notification Phone     Paged MD, pt is feeling extremely uncomfortable, lots of cramping, epidural no longer keeping pt comfortable. Requesting more pain medication. MD coming to bedside to evaluate.

## 2024-04-11 NOTE — DISCHARGE INSTRUCTIONS
Warning Signs after Having a Baby    Keep this paper on your fridge or somewhere else where you can see it.    Call your provider if you have any of these symptoms up to 12 weeks after having your baby.    Thoughts of hurting yourself or your baby  Pain in your chest or trouble breathing  Severe headache not helped by pain medicine  Eyesight concerns (blurry vision, seeing spots or flashes of light, other changes to eyesight)  Fainting, shaking or other signs of a seizure    Call 9-1-1 if you feel that it is an emergency.     The symptoms below can happen to anyone after giving birth. They can be very serious. Call your provider if you have any of these warning signs.    [facility] phone number: [phone]    Losing too much blood (hemorrhage)    Call your provider if you soak through a pad in less than an hour or pass blood clots bigger than a golf ball. These may be signs that you are bleeding too much.    Blood clots in the legs or lungs    After you give birth, your body naturally clots its blood to help prevent blood loss. Sometimes this increased clotting can happen in other areas of the body, like the legs or lungs. This can block your blood flow and be very dangerous.     Call your provider if you:  Have a red, swollen spot on the back of your leg that is warm or painful when you touch it.   Are coughing up blood.     Infection    Call your provider if you have any of these symptoms:  Fever of 100.4 F (38 C) or higher.  Pain or redness around your stitches if you had an incision.   Any yellow, white, or green fluid coming from places where you had stitches or surgery.    Mood Problems (postpartum depression)    Many people feel sad or have mood changes after having a baby. But for some people, these mood swings are worse.     Call your provider right away if you feel so anxious or nervous that you can't care for yourself or your baby.    Preeclampsia (high blood pressure)    Even if you didn't have high  blood pressure when you were pregnant, you are at risk for the high blood pressure disease called preeclampsia. This risk can last up to 12 weeks after giving birth.     Call your provider if you have:   Pain on your right side under your rib cage  Sudden swelling in the hands and face    Remember: You know your body. If something doesn't feel right, get medical help.     For informational purposes only. Not to replace the advice of your health care provider. Copyright 2020 [facility]. All rights reserved. Clinically reviewed by Laurence Alex RNC-OB, MSN. [employer] [id number] - Rev 02/23.

## 2024-04-11 NOTE — PLAN OF CARE
Problem: Adult Inpatient Plan of Care  Goal: Plan of Care Review  Outcome: Met  Flowsheets (Taken 4/11/2024 1331)  Outcome Evaluation: go home today and grief with family support  Plan of Care Reviewed With:   patient   family  Overall Patient Progress: improving  Goal: Patient-Specific Goal (Individualized)  Outcome: Met  Goal: Absence of Hospital-Acquired Illness or Injury  Outcome: Met  Goal: Optimal Comfort and Wellbeing  Outcome: Met  Intervention: Provide Person-Centered Care  Recent Flowsheet Documentation  Taken 4/11/2024 1230 by Kaitlyn Berry RN  Trust Relationship/Rapport:   care explained   choices provided   questions answered   questions encouraged   reassurance provided   thoughts/feelings acknowledged  Taken 4/11/2024 1120 by Kaitlyn Berry RN  Trust Relationship/Rapport:   care explained   choices provided   emotional support provided   empathic listening provided   thoughts/feelings acknowledged   reassurance provided  Goal: Readiness for Transition of Care  Outcome: Met  Flowsheets (Taken 4/11/2024 1138)  Concerns to be Addressed: all concerns addressed in this encounter  Intervention: Mutually Develop Transition Plan  Recent Flowsheet Documentation  Taken 4/11/2024 1138 by Kaitlyn Berry RN  Concerns to be Addressed: all concerns addressed in this encounter  Patient/Family Anticipates Transition to: home with family  Equipment Currently Used at Home: none   Goal Outcome Evaluation:      Plan of Care Reviewed With: patient, family    Overall Patient Progress: improvingOverall Patient Progress: improving    Outcome Evaluation: go home today and grieve with family support

## 2024-04-11 NOTE — CONSULTS
"INITIAL SOCIAL WORK MATERNITY ASSESSMENT     DATA:      Reason for Social Work Consult: fetal loss at 19W 1D     Presenting Information: SW met with patient, patient's boyfriend/FOB, & patient's mother. Patient requested to utilize a  & one was used with the IPAD in the room.     Living Situation: Patient only responded with \"good\" when SW inquired.      Social Support/Professional Community Support:  Patient shared she has support through friends & family in the area     Insurance: [employer]      History of Postpartum Mood Disorders: Not applicable as this is patient's first baby. SW discussed risk for postpartum depression & provided information on postpartum mood disorders & resources.      Chemical Health History: none noted        INTERVENTION:      SW completed chart review and collaborated with the multidisciplinary team.   Psychosocial Assessment   Introduction to Maternal Child Health  role and scope of practice   Reviewed Hospital and Community Resources   Assessed Chemical Health History and Current Symptoms   Assessed Mental Health History and Current Symptoms   Identified stressors, barriers and family concerns   Provided support and active empathetic listening and validation.   Provided psychoeducation on  mood and anxiety disorders, assessed for any current symptoms or history    ASSESSMENT:      SW met with patient & offered condolences on her loss. SW provided information on additional support resources in the area related to pregnancy loss. Patient voiced she has good support through her family. SW gave her information on Pregnancy & Postpartum Support [place] & discussed their help line which is available 7 days a week. SW also discussed the [facility] & offered to make a referral for patient to the peer to peer support program. Patient declined the offer for the referral to be made but accepted information on [employer] [facility] [place] Support Group for " fetal/infant loss.     Patient then requested to talk about next steps for baby. SW [facility] & also discussed that the parents had the choice to choose a private burial or cremation services. SW gave them the  home & cemetary list, infant burial program information sheet, & SW also provided the infant burial program information translated into Welsh. Patient became very tearful during the discussion of next steps for baby & covered her face to cry multiple times during the meeting. SW provided pauses for her to grieve & also offered to come back to discuss the information further at another time. Patient voiced her preference to get all of the information now & not wait. Family voiced concerns over paying for private arrangements & also having to wait for the group burial as baby would not be buried until the first Saturday in August. SW discussed the burial assistance program through the [facility]. Family & patient voiced they will need additional time to discuss the options prior to making a decision for baby. SW contact information was given if questions arise after discharge & SW also encouraged them to reach out to their nurse if they would like SW to come back to answer additional questions or review the information again prior to discharge.      PLAN:       SW will continue to follow & remains available to assist if support or needs arise.     Tori Ngo [facility]  2024  12:02 PM     SW met with patient to check in & patient updated they have chosen the group burial option for baby. SW assisted in answering questions about the group burial & confirming that baby will be in the services on the first Saturday in August at 10 AM. Patient denied any additional needs or questions & again voiced understanding of how to reach SW if questions arise.     Tori Ngo [facility]  [facility]  4/11/2024  12:25 PM

## 2024-04-11 NOTE — PROVIDER NOTIFICATION
04/10/24 2012   Provider Notification   Provider Name/Title    Method of Notification Phone   Request Evaluate - Remote   Notification Reason Patient Request     Paged MD to notify that pt has made her decision and is requesting MD to come to bedside to discuss

## 2024-04-11 NOTE — PROVIDER NOTIFICATION
04/11/24 1100   Provider Notification   Provider Name/Title Dr Caraballo   Method of Notification At Bedside   Notification Reason Other

## 2024-04-11 NOTE — PLAN OF CARE
Pt doing ok this morning, vss and bleeding wnl. IV removed per pt request due to pain and infiltration. Ambulated to bathroom and able to void. Pt declines spiritual health and would like to keep baby in room. Breakfast ordered for pt and bereavement tray brought for family. Pt also denies any pain.

## 2024-04-11 NOTE — ANESTHESIA PROCEDURE NOTES
"Epidural catheter Procedure Note    Pre-Procedure   Staff -        Anesthesiologist:  Aric Forde MD       Performed By: anesthesiologist       Referred By: Mattie       Location: OB       Pre-Anesthestic Checklist: patient identified, IV checked, risks and benefits discussed, informed consent, monitors and equipment checked, pre-op evaluation, at physician/surgeon's request and post-op pain management  Timeout:       Correct Patient: Yes        Correct Procedure: Yes        Correct Site: Yes        Correct Position: Yes   Procedure Documentation  Procedure: epidural catheter       Patient Position: sitting       Patient Prep/Sterile Barriers: sterile gloves, mask, patient draped       Skin prep: Betadine       Local skin infiltrated with mL of 1% lidocaine.        Insertion Site: L3-4. (midline approach).       Technique: LORT saline        TAOVN at 6 cm.       Needle Type: Omnirelianty needle       Needle Gauge: 17.        Needle Length (Inches): 3.5        Catheter: 19 G.          Catheter threaded easily.         6 cm epidural space.         Threaded 12 cm at skin.         # of attempts: 1 and  # of redirects:     Assessment/Narrative         Paresthesias: No.       Test dose of 3 mL lidocaine 1.5% w/ 1:200,000 epinephrine at 23:31 CDT.         Test dose negative, 3 minutes after injection, for signs of intravascular, subdural, or intrathecal injection.       Insertion/Infusion Method: LORT saline       Aspiration negative for Heme or CSF via Epidural Catheter.    Medication(s) Administered   0.25% Bupivacaine PF (Epidural) - EPIDURAL   10 mL - 4/10/2024 11:34:00 PM    FOR [facility] ([facility]) ONLY:   Pain Team Contact information: please page the Pain Team Via [employer]. Search \"Pain\". During daytime hours, please page the attending first. At night please page the resident first.      "

## 2024-04-11 NOTE — PROGRESS NOTES
Brief Update Note    Called to assess patient - complaining of increasing pressure, cramping pain. Does not feel like she'd be able to wait until morning in this amount of discomfort.     SVE - bulging, taut bag filling upper vagina, difficult to assess cervical dilation behind bag    Discussed with patient - reviewed options for pain control, but discussed that it appears her body is starting to labor and she may not make it until the planned surgical procedure. Discussed we could proceed with induction which would likely complete process much faster and prevent her from being uncomfortable all night. Reviewed epidural is an option for pain control during induction. Patient would like to proceed with epidural placement and induction of labor.    Vaginal Misoprostol 400mcg Q3hrs per protocol once comfortable.     Lauren MacNeill, MD [facility]  [phone]  04/10/2024 11:21 PM

## 2024-04-12 ENCOUNTER — PATIENT OUTREACH (OUTPATIENT)
Dept: CARE COORDINATION | Facility: CLINIC | Age: 24
End: 2024-04-12
Payer: COMMERCIAL

## 2024-04-12 LAB
BACTERIA UR CULT: NORMAL
PATH REPORT.COMMENTS IMP SPEC: NORMAL
PATH REPORT.FINAL DX SPEC: NORMAL
PATH REPORT.FINAL DX SPEC: NORMAL
PATH REPORT.GROSS SPEC: NORMAL
PATH REPORT.GROSS SPEC: NORMAL
PATH REPORT.MICROSCOPIC SPEC OTHER STN: NORMAL
PATH REPORT.MICROSCOPIC SPEC OTHER STN: NORMAL
PATH REPORT.RELEVANT HX SPEC: NORMAL
PATH REPORT.RELEVANT HX SPEC: NORMAL
PHOTO IMAGE: NORMAL
PHOTO IMAGE: NORMAL

## 2024-04-12 NOTE — PROGRESS NOTES
[facility]: [facility]: Post-Discharge Note  SITUATION                                                      Admission:    Admission Date: 04/10/24   Reason for Admission: - Intrauterine pregnancy at 19w0d  - Vaginal bleeding  - Cervical dilation and prolapsing amniotic sac  - Family history of preeclampsia  - Elevated A1c at NOB1, normal 2hr OGTT  Discharge:   Discharge Date: 24  Discharge Diagnosis: - Same, now delivered    BACKGROUND                                                      Per hospital discharge summary and inpatient provider notes:    Manjeet Shah is a 23 year old  at 19w0d by LMP c/w 8wk US presenting for evaluation after being seen in clinic today. Presented last night to triage for a small amount of vaginal bleeding. Was seen in clinic today for close follow up and speculum exam concerning for bag of water in the vagina.      Here, she denies any cramping or contractions. She continues to have some vaginal bleeding. She denies any large gush of fluid. She denies any recent illness or fevers.       ASSESSMENT           Discharge Assessment  How are you doing now that you are home?: Patient states a bit sad but good. Bleeding is similar to a normal period.  Denies any pain. Denies any headache, blurry vision or increased swelling of face or hands.  Denies any thoughts of harming self or others. A little pain at the epidural site, denies any redness, warmth or swelling at the site.  How are your symptoms? (Red Flag symptoms escalate to triage hotline per guidelines): Other (States she feels normal, feels in shock)  Do you feel your condition is stable enough to be safe at home until your provider visit?: Yes  Does the patient have their discharge instructions? : Yes  Does the patient have questions regarding their discharge instructions? : No  Were you started on any new medications or were there changes to any of your previous  medications? : Yes  Does the patient have all of their medications?: Yes  Do you have questions regarding any of your medications? : No  Do you have all of your needed medical supplies or equipment (DME)?  (i.e. oxygen tank, CPAP, cane, etc.): Yes  Discharge follow-up appointment scheduled within 14 calendar days? : No  Is patient agreeable to assistance with scheduling? : Yes         Post-op (Clinicians Only)  Did the patient have surgery or a procedure: Yes  Bleeding: moderate (Normal vaginal bleeding post delivery)  Fever: No  Chills: No  Redness: No  Warmth: No  Swelling: No  Incision site pain: No  Eating & Drinking: eating and drinking without complaints/concerns  PO Intake: regular diet  Additional Symptoms: decreased appetite  Bowel Function: normal  Date of last BM: 04/11/24  Urinary Status: voiding without complaint/concerns    Patient was warm transferred to scheduling at [facility] to set up follow-up appointment.     PLAN                                                      Outpatient Plan:  Follow up with primary OB for routine postpartum visit in 2 weeks      No future appointments.      For any urgent concerns, please contact our 24 hour nurse triage line: [phone] ([phone])         Cait Small RN

## 2024-04-12 NOTE — ANESTHESIA POSTPROCEDURE EVALUATION
Patient: Manjeet Shah    Procedure: * No procedures listed *       Anesthesia Type:  Epidural    Note:     Postop Pain Control:    PONV:    Neuro/Psych:    Airway/Respiratory:    CV/Hemodynamics:    Other NRE:    DID A NON-ROUTINE EVENT OCCUR?     Event details/Postop Comments:      S/P epidural for labor.   I or my partner was immediately available for management of this patient during epidural analgesia infusion.  VSS.  Doing well. Block resolved.  Neuro at baseline. Denies positional headache. Minimal side effects easily managed w/ PRN meds. No apparent anesthetic complications. No follow-up required.    Chris Villarreal MD             Last vitals:  Vitals:    04/11/24 0710 04/11/24 0723 04/11/24 1230   BP:  107/61 116/72   Resp: 18  16   Temp: 99  F (37.2  C)  97.9  F (36.6  C)   SpO2:          Electronically Signed By: Chris Villarreal MD  April 12, 2024  8:00 AM

## 2024-05-19 ENCOUNTER — HEALTH MAINTENANCE LETTER (OUTPATIENT)
Age: 24
End: 2024-05-19

## 2025-06-08 ENCOUNTER — HEALTH MAINTENANCE LETTER (OUTPATIENT)
Age: 25
End: 2025-06-08